# Patient Record
Sex: MALE | Race: WHITE | NOT HISPANIC OR LATINO | Employment: UNEMPLOYED | RURAL
[De-identification: names, ages, dates, MRNs, and addresses within clinical notes are randomized per-mention and may not be internally consistent; named-entity substitution may affect disease eponyms.]

---

## 2022-02-17 ENCOUNTER — HOSPITAL ENCOUNTER (EMERGENCY)
Facility: HOSPITAL | Age: 42
Discharge: HOME | End: 2022-02-17
Payer: COMMERCIAL

## 2022-02-17 VITALS
TEMPERATURE: 99 F | BODY MASS INDEX: 27.35 KG/M2 | WEIGHT: 220 LBS | SYSTOLIC BLOOD PRESSURE: 110 MMHG | RESPIRATION RATE: 18 BRPM | DIASTOLIC BLOOD PRESSURE: 79 MMHG | HEART RATE: 97 BPM | HEIGHT: 75 IN | OXYGEN SATURATION: 98 %
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
## 2022-02-17 DIAGNOSIS — S79.919A HIP INJURY, INITIAL ENCOUNTER: ICD-10-CM

## 2022-02-17 PROCEDURE — 96372 THER/PROPH/DIAG INJ SC/IM: CPT

## 2022-02-17 PROCEDURE — 63600175 PHARM REV CODE 636 W HCPCS: Performed by: NURSE PRACTITIONER

## 2022-02-17 PROCEDURE — 99285 EMERGENCY DEPT VISIT HI MDM: CPT | Mod: 25

## 2022-02-17 PROCEDURE — 25000003 PHARM REV CODE 250

## 2022-02-17 PROCEDURE — 99284 PR EMERGENCY DEPT VISIT,LEVEL IV: ICD-10-PCS | Mod: ,,,

## 2022-02-17 PROCEDURE — 99284 EMERGENCY DEPT VISIT MOD MDM: CPT | Mod: ,,,

## 2022-02-17 RX ORDER — ACETAMINOPHEN 500 MG
1000 TABLET ORAL
Status: COMPLETED | OUTPATIENT
Start: 2022-02-17 | End: 2022-02-17

## 2022-02-17 RX ORDER — KETOROLAC TROMETHAMINE 30 MG/ML
30 INJECTION, SOLUTION INTRAMUSCULAR; INTRAVENOUS
Status: COMPLETED | OUTPATIENT
Start: 2022-02-17 | End: 2022-02-17

## 2022-02-17 RX ADMIN — ACETAMINOPHEN 1000 MG: 500 TABLET ORAL at 07:02

## 2022-02-17 RX ADMIN — KETOROLAC TROMETHAMINE 30 MG: 30 INJECTION, SOLUTION INTRAMUSCULAR at 05:02

## 2022-02-17 NOTE — ED PROVIDER NOTES
Encounter Date: 2/17/2022       History     Chief Complaint   Patient presents with    Hip Pain    Back Pain     Patient reports headache, neck pain, and left hip pain after a fall earlier today.  Reports slipping and fell onto his backside, also striking his head.  No loss of consciousness.  No change in mental status.  Denies nausea vomiting.  Denies paresthesia of lower extremities.  He is ambulatory.        Review of patient's allergies indicates:  No Known Allergies  Past Medical History:   Diagnosis Date    H/O shoulder surgery     bilateral    History of lumbar spinal fusion      History reviewed. No pertinent surgical history.  History reviewed. No pertinent family history.  Social History     Tobacco Use    Smoking status: Never Smoker    Smokeless tobacco: Never Used   Substance Use Topics    Alcohol use: Not Currently    Drug use: Never     Review of Systems   Constitutional: Negative for fever.   HENT: Negative for trouble swallowing.    Eyes: Negative for visual disturbance.   Respiratory: Negative for shortness of breath.    Cardiovascular: Negative for chest pain, palpitations and leg swelling.   Gastrointestinal: Negative for abdominal pain, diarrhea, nausea and vomiting.   Genitourinary: Negative for decreased urine volume, dysuria, frequency and hematuria.   Musculoskeletal: Positive for arthralgias (left hip) and neck pain.   Skin: Negative for color change.   Neurological: Positive for headaches. Negative for dizziness and numbness.       Physical Exam     Initial Vitals [02/17/22 1706]   BP Pulse Resp Temp SpO2   (!) 139/92 (!) 114 15 98.3 °F (36.8 °C) 98 %      MAP       --         Physical Exam    Nursing note and vitals reviewed.  Constitutional: No distress.   HENT:   Head: Normocephalic and atraumatic.   Eyes: EOM are normal. Pupils are equal, round, and reactive to light.   Neck: Neck supple.   Cardiovascular: Normal rate, regular rhythm and normal heart sounds.   Pulmonary/Chest:  Breath sounds normal. No respiratory distress.   Abdominal: Abdomen is soft. There is no abdominal tenderness.   Musculoskeletal:         General: Normal range of motion.      Cervical back: Neck supple.      Comments: Cervical spine tenderness. Left posterior hip tenderness. Stable pelvis.     Neurological: He is alert and oriented to person, place, and time. No cranial nerve deficit. GCS score is 15. GCS eye subscore is 4. GCS verbal subscore is 5. GCS motor subscore is 6.   Skin: Skin is warm and dry. Capillary refill takes less than 2 seconds.         Medical Screening Exam   See Full Note    ED Course   Procedures  Labs Reviewed - No data to display       Imaging Results          CT Head Without Contrast (Final result)  Result time 02/17/22 18:17:23    Final result by Todd Chao MD (02/17/22 18:17:23)                 Impression:      1. No acute intracranial abnormality.    2.  Right frontal sinus opacification.  Correlate clinically to exclude possibility of underlying sinusitis.    Place of service: [facility]      Electronically signed by: Todd Chao  Date:    02/17/2022  Time:    18:17             Narrative:    EXAMINATION:  CT HEAD WITHOUT CONTRAST    CLINICAL HISTORY:  head injury;    TECHNIQUE:  Axial CT imaging from the vertex to skull the skull base was performed without contrast. Total DLP: 717 mGy*cm    Dose reduction:    The CT exam was performed using one or more dose reduction techniques: Automatic exposure control, automated adjustment of the MA and/or kVP according to patient size, or use of iterative reconstruction technique.    COMPARISON:  None.    FINDINGS:  Cortical sulci, ventricles and basilar cisterns are within normal limits in appearance. There is no evidence of hydrocephalus, midline shift or mass effect. Gray and white matter differentiation is preserved. There is no CT evidence of acute intracranial hemorrhage or infarction.    The calvarium is intact. The visualized  orbits and globes appear within normal limits.  Right frontal sinus opacification is noted.  Otherwise, the paranasal sinuses and mastoid air cells are predominantly clear. Scalp soft tissues appear unremarkable.                               CT Cervical Spine Without Contrast (Final result)  Result time 02/17/22 18:13:59    Final result by Todd Chao MD (02/17/22 18:13:59)                 Impression:      1. No acute fracture or subluxation in the cervical spine.    2.  Prominent spinal stenosis suggested at C5-6.  This would be better evaluated by MRI imaging of the spine which could be obtained on a nonemergent basis.    Otherwise, primarily mild degenerative changes as detailed above.    Place of service: [facility]      Electronically signed by: Todd Chao  Date:    02/17/2022  Time:    18:13             Narrative:    EXAMINATION:  CT CERVICAL SPINE WITHOUT CONTRAST    CLINICAL HISTORY:  neck injury;    TECHNIQUE:  2 mm axial images were obtained from the skull base through the lung apices without contrast. The images were then reformatted into the coronal and sagittal planes.    Dose reduction:    The CT exam was performed using one or more dose reduction techniques: Automatic exposure control, automated adjustment of the MA and/or kVP according to patient size, or use of iterative reconstruction technique.    COMPARISON:  None.    FINDINGS:  The vertebral height and alignment is within normal limits. There is no evidence for acute fracture or subluxation. No prevertebral soft tissue swelling is suggested. The atlantoaxial and atlantooccipital articulations are normal. Skull base appears unremarkable.    Prominent posterior disc bulge and disc osteophyte complex at the C5-6 level and to a lesser degree at C6-C7 level is suggested.  On axial imaging, there is suggestion of moderate-severe spinal stenosis at C5-6, which is otherwise not well evaluated by CT technique.    The lung apices are  clear. The thyroid gland appears normal.                               X-Ray Hip 2 or 3 views Left (with Pelvis when performed) (Final result)  Result time 02/17/22 19:10:53    Final result by Todd Chao MD (02/17/22 19:10:53)                 Impression:      No acute radiographic abnormality.    Place of service: [facility]      Electronically signed by: Todd Chao  Date:    02/17/2022  Time:    19:10             Narrative:    EXAMINATION:  XR hip with pelvis, three views left    CLINICAL HISTORY:  Left hip pain, pelvic pain    COMPARISON:  None available    FINDINGS:  There is no acute osseous, articular or soft tissue abnormality identified.  Lumbosacral pedicle screws appear grossly intact as included in the field of view.  Probable benign bone islands are noted within the left proximal femur    The left hip joint space appears relatively preserved.                                 Medications   acetaminophen tablet 1,000 mg (has no administration in time range)   ketorolac injection 30 mg (30 mg Intramuscular Given 2/17/22 1730)                       Clinical Impression:   Final diagnoses:  [S79.919A] Hip injury, initial encounter  [W19.XXXA] Fall, initial encounter (Primary)          ED Disposition Condition    Discharge Stable        ED Prescriptions     None        Follow-up Information    None          AMADA Hill  02/17/22 [id number]

## 2022-02-17 NOTE — ED TRIAGE NOTES
Pt presents to ed from [facility] where he reports a slip and fall approximately 1 hour ago. Pt states he fell onto left hip. Pt complaining of left hip and lower back pain.